# Patient Record
Sex: FEMALE | Race: OTHER | HISPANIC OR LATINO | URBAN - METROPOLITAN AREA
[De-identification: names, ages, dates, MRNs, and addresses within clinical notes are randomized per-mention and may not be internally consistent; named-entity substitution may affect disease eponyms.]

---

## 2018-05-31 ENCOUNTER — EMERGENCY (EMERGENCY)
Facility: HOSPITAL | Age: 33
LOS: 1 days | Discharge: HOME | End: 2018-05-31
Attending: EMERGENCY MEDICINE
Payer: COMMERCIAL

## 2018-05-31 VITALS
DIASTOLIC BLOOD PRESSURE: 96 MMHG | SYSTOLIC BLOOD PRESSURE: 148 MMHG | RESPIRATION RATE: 18 BRPM | OXYGEN SATURATION: 98 % | TEMPERATURE: 98 F | HEART RATE: 72 BPM

## 2018-05-31 VITALS
OXYGEN SATURATION: 100 % | TEMPERATURE: 98 F | SYSTOLIC BLOOD PRESSURE: 130 MMHG | HEART RATE: 65 BPM | RESPIRATION RATE: 17 BRPM | DIASTOLIC BLOOD PRESSURE: 77 MMHG

## 2018-05-31 PROCEDURE — 70486 CT MAXILLOFACIAL W/O DYE: CPT | Mod: 26

## 2018-05-31 PROCEDURE — 70450 CT HEAD/BRAIN W/O DYE: CPT

## 2018-05-31 PROCEDURE — 99284 EMERGENCY DEPT VISIT MOD MDM: CPT | Mod: 25

## 2018-05-31 PROCEDURE — 96375 TX/PRO/DX INJ NEW DRUG ADDON: CPT

## 2018-05-31 PROCEDURE — 70450 CT HEAD/BRAIN W/O DYE: CPT | Mod: 26

## 2018-05-31 PROCEDURE — 96374 THER/PROPH/DIAG INJ IV PUSH: CPT

## 2018-05-31 PROCEDURE — 70486 CT MAXILLOFACIAL W/O DYE: CPT

## 2018-05-31 RX ORDER — ACETAMINOPHEN 500 MG
1000 TABLET ORAL ONCE
Qty: 0 | Refills: 0 | Status: COMPLETED | OUTPATIENT
Start: 2018-05-31 | End: 2018-05-31

## 2018-05-31 RX ORDER — SODIUM CHLORIDE 9 MG/ML
1000 INJECTION INTRAMUSCULAR; INTRAVENOUS; SUBCUTANEOUS ONCE
Qty: 0 | Refills: 0 | Status: COMPLETED | OUTPATIENT
Start: 2018-05-31 | End: 2018-05-31

## 2018-05-31 RX ORDER — METOCLOPRAMIDE HCL 10 MG
10 TABLET ORAL ONCE
Qty: 0 | Refills: 0 | Status: COMPLETED | OUTPATIENT
Start: 2018-05-31 | End: 2018-05-31

## 2018-05-31 RX ORDER — KETOROLAC TROMETHAMINE 30 MG/ML
15 SYRINGE (ML) INJECTION ONCE
Qty: 0 | Refills: 0 | Status: DISCONTINUED | OUTPATIENT
Start: 2018-05-31 | End: 2018-05-31

## 2018-05-31 RX ADMIN — SODIUM CHLORIDE 1000 MILLILITER(S): 9 INJECTION INTRAMUSCULAR; INTRAVENOUS; SUBCUTANEOUS at 03:31

## 2018-05-31 RX ADMIN — Medication 10 MILLIGRAM(S): at 03:15

## 2018-05-31 RX ADMIN — Medication 15 MILLIGRAM(S): at 03:15

## 2018-05-31 RX ADMIN — Medication 400 MILLIGRAM(S): at 03:30

## 2018-05-31 NOTE — ED PROVIDER NOTE - ATTENDING CONTRIBUTION TO CARE
****ATTENDING**** 34yo f presents s/p fall w head injury. Pt states she was in [place] and fell on Sunday night w trauma to the head. + LOC> states she did not eat well that day and resulted in fall onto a brick wall and then the floor. Pt seen at hospital, sutures placed. Now pt co diffuse HA and dizziness. No change in vision. No cp, palp, sob.  VS reviewed.  On exam, Patient is awake, alert x 3. forehead w sutures in place, no discharge or tenderness. GCS15. B/L infraorbital ecchymosis. PERRL.No Posterior midline cervical spine tenderness. Full ROM and neuro intact. Chest is clear to auscultation. +S1S2. Abdomen is soft nondistended/nontender +BS. Pelvis is stable. Full ROM B/L hips. Back non tender midline T/L spine.   Pt well appearing, check ct head and m/f. pain control.  Sutures in place for 3 d would leave for additional 2 days for re eval.

## 2018-05-31 NOTE — ED ADULT NURSE NOTE - CHIEF COMPLAINT QUOTE
head wound check, redness noted to upper and lower eye lids   patient passed out in [place] on Sunday with +LOC, had wound stitched, no tetanus received;   c/o dizziness and lightheadedness; patient states wound appears more swollen, wearing bandage in triage

## 2018-05-31 NOTE — ED ADULT TRIAGE NOTE - CHIEF COMPLAINT QUOTE
head wound check, redness noted to upper and lower eye lids   patient passed out in [place] on Sunday, had wound stitched, no tetanus received;   c/o dizziness and lightheadedness; patient states wound appears more swollen, wearing bandage in triage head wound check, redness noted to upper and lower eye lids   patient passed out in [place] on Sunday with +LOC, had wound stitched, no tetanus received;   c/o dizziness and lightheadedness; patient states wound appears more swollen, wearing bandage in triage

## 2018-05-31 NOTE — ED PROVIDER NOTE - OBJECTIVE STATEMENT
33yoF pw head injury after a fall in the [place] 4 days ago. patient was standing up quickly, fell forward and hit head on brick wall. went to the ER in  and had lac sewn up, and sent home. Now endorsing some headaches, dizziness and not feeling well. traveled back to [place] today. denies vomiting, diarrhea, chest pain, sob or other issues.

## 2018-05-31 NOTE — ED PROVIDER NOTE - PLAN OF CARE
1) Follow up with your doctor in 1 - 2 weeks.   2) Return to the ER immediately for new or worsening symptoms including severe uncontrollable pain, passing out, slurred speech or other issues.   3) Take ibuprofen 600mg every 6 hours as needed for headaches.   4) take Tyelnol 650mg every 6 hours as needed for headaches.

## 2018-05-31 NOTE — ED PROVIDER NOTE - MEDICAL DECISION MAKING DETAILS
33yoF pw fall 4 days ago with sutures. appears well. will obs, meds, and dc. consider ct scan. unlikely bleed.

## 2018-05-31 NOTE — ED PROVIDER NOTE - PHYSICAL EXAMINATION
3cm laceration to anterior forehead with sutures in place. healing well, no discharge or surrounding erythema.

## 2018-05-31 NOTE — ED PROVIDER NOTE - CARE PLAN
Principal Discharge DX:	Fall, initial encounter  Assessment and plan of treatment:	1) Follow up with your doctor in 1 - 2 weeks.   2) Return to the ER immediately for new or worsening symptoms including severe uncontrollable pain, passing out, slurred speech or other issues.   3) Take ibuprofen 600mg every 6 hours as needed for headaches.   4) take Tyelnol 650mg every 6 hours as needed for headaches.  Secondary Diagnosis:	Laceration of head without foreign body, unspecified part of head, subsequent encounter

## 2018-05-31 NOTE — ED ADULT NURSE NOTE - OBJECTIVE STATEMENT
pt states, "[place] On Sunday I didn't eat anything and all I remember was waking up on a stretcher and I was told that I had passed out and fell face first on some rocks. I was stitched up and given abx to take. the next day I have been having HA and feeling dizzy since the following day. today I came back and I feel like my face is getting more swollen." pt denies any chest pain, SOB, v/d, abd. pain, numbness/tingling at present. wound noted to pt forehead, no drainage or redness noted at present.

## 2019-10-31 ENCOUNTER — OUTPATIENT (OUTPATIENT)
Dept: OUTPATIENT SERVICES | Facility: HOSPITAL | Age: 34
LOS: 1 days | End: 2019-10-31
Payer: COMMERCIAL

## 2019-10-31 VITALS
HEART RATE: 74 BPM | DIASTOLIC BLOOD PRESSURE: 101 MMHG | HEIGHT: 64 IN | OXYGEN SATURATION: 100 % | SYSTOLIC BLOOD PRESSURE: 157 MMHG | WEIGHT: 190.04 LBS | TEMPERATURE: 99 F | RESPIRATION RATE: 16 BRPM

## 2019-10-31 DIAGNOSIS — N93.9 ABNORMAL UTERINE AND VAGINAL BLEEDING, UNSPECIFIED: ICD-10-CM

## 2019-10-31 DIAGNOSIS — Z01.818 ENCOUNTER FOR OTHER PREPROCEDURAL EXAMINATION: ICD-10-CM

## 2019-10-31 DIAGNOSIS — N92.0 EXCESSIVE AND FREQUENT MENSTRUATION WITH REGULAR CYCLE: ICD-10-CM

## 2019-10-31 DIAGNOSIS — G47.33 OBSTRUCTIVE SLEEP APNEA (ADULT) (PEDIATRIC): ICD-10-CM

## 2019-10-31 DIAGNOSIS — N84.0 POLYP OF CORPUS UTERI: ICD-10-CM

## 2019-10-31 LAB
ANION GAP SERPL CALC-SCNC: 7 MMOL/L — SIGNIFICANT CHANGE UP (ref 5–17)
BUN SERPL-MCNC: 14 MG/DL — SIGNIFICANT CHANGE UP (ref 7–23)
CALCIUM SERPL-MCNC: 9 MG/DL — SIGNIFICANT CHANGE UP (ref 8.5–10.1)
CHLORIDE SERPL-SCNC: 107 MMOL/L — SIGNIFICANT CHANGE UP (ref 96–108)
CO2 SERPL-SCNC: 25 MMOL/L — SIGNIFICANT CHANGE UP (ref 22–31)
CREAT SERPL-MCNC: 0.73 MG/DL — SIGNIFICANT CHANGE UP (ref 0.5–1.3)
GLUCOSE SERPL-MCNC: 80 MG/DL — SIGNIFICANT CHANGE UP (ref 70–99)
HCG SERPL-ACNC: <1 MIU/ML — SIGNIFICANT CHANGE UP
HCT VFR BLD CALC: 36.4 % — SIGNIFICANT CHANGE UP (ref 34.5–45)
HGB BLD-MCNC: 11.1 G/DL — LOW (ref 11.5–15.5)
MCHC RBC-ENTMCNC: 25.5 PG — LOW (ref 27–34)
MCHC RBC-ENTMCNC: 30.5 GM/DL — LOW (ref 32–36)
MCV RBC AUTO: 83.7 FL — SIGNIFICANT CHANGE UP (ref 80–100)
NRBC # BLD: 0 /100 WBCS — SIGNIFICANT CHANGE UP (ref 0–0)
PLATELET # BLD AUTO: 420 K/UL — HIGH (ref 150–400)
POTASSIUM SERPL-MCNC: 4.3 MMOL/L — SIGNIFICANT CHANGE UP (ref 3.5–5.3)
POTASSIUM SERPL-SCNC: 4.3 MMOL/L — SIGNIFICANT CHANGE UP (ref 3.5–5.3)
RBC # BLD: 4.35 M/UL — SIGNIFICANT CHANGE UP (ref 3.8–5.2)
RBC # FLD: 15.3 % — HIGH (ref 10.3–14.5)
SODIUM SERPL-SCNC: 139 MMOL/L — SIGNIFICANT CHANGE UP (ref 135–145)
WBC # BLD: 9.29 K/UL — SIGNIFICANT CHANGE UP (ref 3.8–10.5)
WBC # FLD AUTO: 9.29 K/UL — SIGNIFICANT CHANGE UP (ref 3.8–10.5)

## 2019-10-31 PROCEDURE — 86901 BLOOD TYPING SEROLOGIC RH(D): CPT

## 2019-10-31 PROCEDURE — 86850 RBC ANTIBODY SCREEN: CPT

## 2019-10-31 PROCEDURE — 86900 BLOOD TYPING SEROLOGIC ABO: CPT

## 2019-10-31 PROCEDURE — 84702 CHORIONIC GONADOTROPIN TEST: CPT

## 2019-10-31 PROCEDURE — 80048 BASIC METABOLIC PNL TOTAL CA: CPT

## 2019-10-31 PROCEDURE — 36415 COLL VENOUS BLD VENIPUNCTURE: CPT

## 2019-10-31 PROCEDURE — 85027 COMPLETE CBC AUTOMATED: CPT

## 2019-10-31 PROCEDURE — G0463: CPT

## 2019-10-31 PROCEDURE — 93005 ELECTROCARDIOGRAM TRACING: CPT

## 2019-10-31 PROCEDURE — 93010 ELECTROCARDIOGRAM REPORT: CPT

## 2019-10-31 NOTE — H&P PST ADULT - NSICDXPASTMEDICALHX_GEN_ALL_CORE_FT
PAST MEDICAL HISTORY:  Abnormal uterine and vaginal bleeding, unspecified     Excessive and frequent menstruation with regular cycle     HTN (hypertension)     No pertinent past medical history     RADHA (obstructive sleep apnea)     Polyp of corpus uteri

## 2019-10-31 NOTE — H&P PST ADULT - ASSESSMENT
33 yo female with abnormal uterine and vaginal bleeding scheduled for a D&C hysteroscopy  - polypectomy with myosure on 11/13 with Dr. Griffin

## 2019-10-31 NOTE — H&P PST ADULT - HISTORY OF PRESENT ILLNESS
33 yo female with HTN and RADHA, no use of CPAP, presents to PST scheduled for a D&C hysteroscopy  - polypectomy with myosure on  with Dr. Griffin. Reports heavy abnormal menstrual bleeding for the past 4 months. C/O painful cramping. Reports anemia.  LMP 10/11/19. H/O missed AB

## 2019-10-31 NOTE — H&P PST ADULT - DOCUMENT STATUS
Patient seen and examined  Only needed one overlying dressing change yesterday  Given significant decrease in drainage, OK for nursing to change packing daily  Will signoff at this time  Please call with any questions       Authored by Resident/PA/NP

## 2019-10-31 NOTE — H&P PST ADULT - NSICDXPROBLEM_GEN_ALL_CORE_FT
PROBLEM DIAGNOSES  Problem: Abnormal uterine and vaginal bleeding, unspecified  Assessment and Plan: scheduled for a D&C hysteroscopy  - polypectomy with myosure on 11/13 with Dr. Griffin      Problem: Pre-op evaluation  Assessment and Plan: Labs - CBC, BMP, HCG, T&S and EKG  MC with Dr. Mendoza  [facility]. Take routine am meds DOS with sip of water. Avoid NSAIDs and OTC supplements. Verbalized understanding     Problem: RADHA (obstructive sleep apnea)  Assessment and Plan:

## 2019-10-31 NOTE — H&P PST ADULT - CARDIOVASCULAR COMMENTS
BP high today, have not taken meds yet today; s/p cardio work - up last week - results pending - to f/u with PCP

## 2019-11-12 ENCOUNTER — TRANSCRIPTION ENCOUNTER (OUTPATIENT)
Age: 34
End: 2019-11-12

## 2019-11-12 NOTE — ASU PATIENT PROFILE, ADULT - PMH
Abnormal uterine and vaginal bleeding, unspecified    Excessive and frequent menstruation with regular cycle    HTN (hypertension)    No pertinent past medical history    RADHA (obstructive sleep apnea)    Polyp of corpus uteri

## 2019-11-13 ENCOUNTER — OUTPATIENT (OUTPATIENT)
Dept: OUTPATIENT SERVICES | Facility: HOSPITAL | Age: 34
LOS: 1 days | End: 2019-11-13
Payer: COMMERCIAL

## 2019-11-13 VITALS
RESPIRATION RATE: 12 BRPM | HEART RATE: 85 BPM | OXYGEN SATURATION: 96 % | SYSTOLIC BLOOD PRESSURE: 144 MMHG | DIASTOLIC BLOOD PRESSURE: 90 MMHG

## 2019-11-13 VITALS
DIASTOLIC BLOOD PRESSURE: 104 MMHG | TEMPERATURE: 98 F | HEIGHT: 64 IN | WEIGHT: 184.97 LBS | HEART RATE: 98 BPM | RESPIRATION RATE: 16 BRPM | OXYGEN SATURATION: 99 % | SYSTOLIC BLOOD PRESSURE: 159 MMHG

## 2019-11-13 DIAGNOSIS — N84.0 POLYP OF CORPUS UTERI: ICD-10-CM

## 2019-11-13 DIAGNOSIS — Z01.818 ENCOUNTER FOR OTHER PREPROCEDURAL EXAMINATION: ICD-10-CM

## 2019-11-13 DIAGNOSIS — N93.9 ABNORMAL UTERINE AND VAGINAL BLEEDING, UNSPECIFIED: ICD-10-CM

## 2019-11-13 DIAGNOSIS — N92.0 EXCESSIVE AND FREQUENT MENSTRUATION WITH REGULAR CYCLE: ICD-10-CM

## 2019-11-13 LAB — HCG UR QL: NEGATIVE — SIGNIFICANT CHANGE UP

## 2019-11-13 PROCEDURE — 81025 URINE PREGNANCY TEST: CPT

## 2019-11-13 PROCEDURE — 88305 TISSUE EXAM BY PATHOLOGIST: CPT

## 2019-11-13 PROCEDURE — 58558 HYSTEROSCOPY BIOPSY: CPT

## 2019-11-13 PROCEDURE — 88305 TISSUE EXAM BY PATHOLOGIST: CPT | Mod: 26

## 2019-11-13 RX ORDER — SODIUM CHLORIDE 9 MG/ML
1000 INJECTION, SOLUTION INTRAVENOUS
Refills: 0 | Status: DISCONTINUED | OUTPATIENT
Start: 2019-11-13 | End: 2019-11-13

## 2019-11-13 RX ORDER — HYDROMORPHONE HYDROCHLORIDE 2 MG/ML
0.5 INJECTION INTRAMUSCULAR; INTRAVENOUS; SUBCUTANEOUS
Refills: 0 | Status: DISCONTINUED | OUTPATIENT
Start: 2019-11-13 | End: 2019-11-13

## 2019-11-13 RX ORDER — ALBUTEROL 90 UG/1
0 AEROSOL, METERED ORAL
Qty: 18 | Refills: 0 | DISCHARGE

## 2019-11-13 RX ORDER — ONDANSETRON 8 MG/1
4 TABLET, FILM COATED ORAL ONCE
Refills: 0 | Status: COMPLETED | OUTPATIENT
Start: 2019-11-13 | End: 2019-11-13

## 2019-11-13 RX ORDER — KETOROLAC TROMETHAMINE 30 MG/ML
30 SYRINGE (ML) INJECTION ONCE
Refills: 0 | Status: DISCONTINUED | OUTPATIENT
Start: 2019-11-13 | End: 2019-11-13

## 2019-11-13 RX ORDER — LOSARTAN POTASSIUM 100 MG/1
1 TABLET, FILM COATED ORAL
Qty: 0 | Refills: 0 | DISCHARGE

## 2019-11-13 RX ADMIN — SODIUM CHLORIDE 50 MILLILITER(S): 9 INJECTION, SOLUTION INTRAVENOUS at 08:46

## 2019-11-13 RX ADMIN — SODIUM CHLORIDE 50 MILLILITER(S): 9 INJECTION, SOLUTION INTRAVENOUS at 11:09

## 2019-11-13 RX ADMIN — ONDANSETRON 4 MILLIGRAM(S): 8 TABLET, FILM COATED ORAL at 11:07

## 2019-11-13 NOTE — BRIEF OPERATIVE NOTE - NSICDXBRIEFPROCEDURE_GEN_ALL_CORE_FT
PROCEDURES:  Dilation and curettage, uterus, with polypectomy or uterine myomectomy using MyoSure tissue removal system 13-Nov-2019 11:02:02  Aj Griffin

## 2019-11-13 NOTE — BRIEF OPERATIVE NOTE - NSICDXBRIEFPREOP_GEN_ALL_CORE_FT
PRE-OP DIAGNOSIS:  Abnormal uterine bleeding 13-Nov-2019 11:02:27  Aj Griffin  Endometrial polyp 13-Nov-2019 11:02:34  Aj Griffin

## 2019-11-13 NOTE — ASU DISCHARGE PLAN (ADULT/PEDIATRIC) - CALL YOUR DOCTOR IF YOU HAVE ANY OF THE FOLLOWING:
Fever greater than (need to indicate Fahrenheit or Celsius)/Nausea and vomiting that does not stop/Pain not relieved by Medications/Bleeding that does not stop

## 2023-01-25 PROBLEM — Z00.00 ENCOUNTER FOR PREVENTIVE HEALTH EXAMINATION: Status: ACTIVE | Noted: 2023-01-25

## 2023-01-26 PROBLEM — I10 ESSENTIAL (PRIMARY) HYPERTENSION: Chronic | Status: ACTIVE | Noted: 2019-10-31

## 2023-01-26 PROBLEM — N92.0 EXCESSIVE AND FREQUENT MENSTRUATION WITH REGULAR CYCLE: Chronic | Status: ACTIVE | Noted: 2019-10-31

## 2023-01-26 PROBLEM — N84.0 POLYP OF CORPUS UTERI: Chronic | Status: ACTIVE | Noted: 2019-10-31

## 2023-01-26 PROBLEM — N93.9 ABNORMAL UTERINE AND VAGINAL BLEEDING, UNSPECIFIED: Chronic | Status: ACTIVE | Noted: 2019-10-31

## 2023-01-26 PROBLEM — G47.33 OBSTRUCTIVE SLEEP APNEA (ADULT) (PEDIATRIC): Chronic | Status: ACTIVE | Noted: 2019-10-31

## 2023-01-31 ENCOUNTER — APPOINTMENT (OUTPATIENT)
Dept: ORTHOPEDIC SURGERY | Facility: CLINIC | Age: 38
End: 2023-01-31

## 2023-12-28 ENCOUNTER — APPOINTMENT (OUTPATIENT)
Dept: ORTHOPEDIC SURGERY | Facility: CLINIC | Age: 38
End: 2023-12-28
Payer: COMMERCIAL

## 2023-12-28 VITALS — BODY MASS INDEX: 35.85 KG/M2 | HEIGHT: 64 IN | WEIGHT: 210 LBS

## 2023-12-28 DIAGNOSIS — G89.29 LOW BACK PAIN, UNSPECIFIED: ICD-10-CM

## 2023-12-28 DIAGNOSIS — M54.50 LOW BACK PAIN, UNSPECIFIED: ICD-10-CM

## 2023-12-28 DIAGNOSIS — M54.2 CERVICALGIA: ICD-10-CM

## 2023-12-28 DIAGNOSIS — I10 ESSENTIAL (PRIMARY) HYPERTENSION: ICD-10-CM

## 2023-12-28 PROCEDURE — 72040 X-RAY EXAM NECK SPINE 2-3 VW: CPT

## 2023-12-28 PROCEDURE — 72100 X-RAY EXAM L-S SPINE 2/3 VWS: CPT

## 2023-12-28 PROCEDURE — 99204 OFFICE O/P NEW MOD 45 MIN: CPT

## 2023-12-28 RX ORDER — METHYLPREDNISOLONE 4 MG/1
4 TABLET ORAL
Qty: 1 | Refills: 0 | Status: ACTIVE | COMMUNITY
Start: 2023-12-28 | End: 1900-01-01

## 2023-12-28 RX ORDER — LOSARTAN POTASSIUM 100 MG/1
TABLET, FILM COATED ORAL
Refills: 0 | Status: ACTIVE | COMMUNITY

## 2023-12-28 RX ORDER — METHOCARBAMOL 750 MG/1
750 TABLET, FILM COATED ORAL
Qty: 30 | Refills: 2 | Status: ACTIVE | COMMUNITY
Start: 2023-12-28 | End: 1900-01-01

## 2023-12-28 RX ORDER — AMLODIPINE BESYLATE 5 MG/1
TABLET ORAL
Refills: 0 | Status: ACTIVE | COMMUNITY

## 2023-12-28 NOTE — IMAGING
[Straightening consistent with spasm] : Straightening consistent with spasm [No bony abnormalities] : No bony abnormalities

## 2023-12-28 NOTE — HISTORY OF PRESENT ILLNESS
[Neck] : neck [Lower back] : lower back [Constant] : constant [Sleep] : sleep [Upper back] : upper back [Mid-back] : mid-back [Dull/Aching] : dull/aching [Radiating] : radiating [Sharp] : sharp [Tingling] : tingling [Nothing helps with pain getting better] : Nothing helps with pain getting better [de-identified] : Started with right shoulder pain, then moved up to neck, feels all locked up. Pain does radiate down right worse than left arm, some tingles as well. Using Aleve but not helping. Also has chronic low back pain.  [] : no [[email or web address]] : down the right arm and both legs

## 2023-12-28 NOTE — PHYSICAL EXAM
[Flexion] : flexion [Extension] : extension [Rotation to left] : rotation to left [Rotation to right] : rotation to right [] : negative sitting straight leg raise

## 2024-01-18 ENCOUNTER — APPOINTMENT (OUTPATIENT)
Dept: ORTHOPEDIC SURGERY | Facility: CLINIC | Age: 39
End: 2024-01-18

## 2024-06-23 NOTE — ED ADULT NURSE REASSESSMENT NOTE - SKIN CAPILLARY REFILL
Pt reports chest pain for 2 days, reports pain is worsened with eating and drinking. Denies any cardiac hx.    2 seconds or less